# Patient Record
(demographics unavailable — no encounter records)

---

## 2019-04-30 NOTE — RAD
PA AND LATERAL VIEWS CHEST:

 

HISTORY: 

Dyspnea.

 

FINDINGS: 

Comparison is made with the exam of 1/23/2018.

 

Mild cardiomegaly is again seen.  The aorta is tortuous.  The lungs are expanded without focal areas 
of consolidation, pneumothoraces, or pleural effusions.  There is no evidence of nancy pulmonary cb
a.  There are degenerative changes in the spine.

 

IMPRESSION: 

No acute process.

 

POS: [facility]

## 2019-11-28 NOTE — RAD
Chest AP view



INDICATION: Dyspnea



COMPARISON: April 30, 2019



FINDINGS:



Lungs:The lungs are clear



Cardiac silhouette:Heart size accentuated by exam technique.



Pulmonary vasculature:Normal



Pleural spaces:No pleural effusion or pneumothorax is demonstrated.



Upper abdomen:No abnormality seen.



Osseous structures: Right rotator cuff repair. No acute osseous abnormality.



Additional findings:Vascular calcification of the aortic arch is stable.



IMPRESSION: No acute cardiopulmonary abnormality.



Reported By: Sushil Langley 

Electronically Signed:  11/28/2019 12:50 PM